# Patient Record
Sex: FEMALE | Race: WHITE
[De-identification: names, ages, dates, MRNs, and addresses within clinical notes are randomized per-mention and may not be internally consistent; named-entity substitution may affect disease eponyms.]

---
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
## 2022-02-23 ENCOUNTER — APPOINTMENT (OUTPATIENT)
Dept: SURGERY | Facility: CLINIC | Age: 22
End: 2022-02-23
Payer: MEDICAID

## 2022-02-23 VITALS
TEMPERATURE: 97.9 F | SYSTOLIC BLOOD PRESSURE: 119 MMHG | DIASTOLIC BLOOD PRESSURE: 60 MMHG | HEART RATE: 73 BPM | RESPIRATION RATE: 16 BRPM | OXYGEN SATURATION: 99 % | WEIGHT: 135 LBS | BODY MASS INDEX: 23.05 KG/M2 | HEIGHT: 64 IN

## 2022-02-23 DIAGNOSIS — S39.013A STRAIN OF MUSCLE, FASCIA AND TENDON OF PELVIS, INITIAL ENCOUNTER: ICD-10-CM

## 2022-02-23 PROCEDURE — 99202 OFFICE O/P NEW SF 15 MIN: CPT

## 2022-02-23 NOTE — ASSESSMENT
[FreeTextEntry1] : 22 yo female with right groin strain. discussed imaging in 2-3 months if symptoms don’t improve. For now we discussed exercise modification and groin rehab exercises.

## 2022-02-23 NOTE — HISTORY OF PRESENT ILLNESS
[de-identified] : 22 yo healthy female presents for evaluation of right groin discomfort x 1 month. She states the discomfort in not constant and related to certain movements. She workout out daily doing squats and deadlifts. She denies any lumps. She was seen by her PMD who ordered a sonogram to rule out ovarian cyst, and then referred her here to rule out a hernia.

## 2022-02-23 NOTE — PHYSICAL EXAM
[Respiratory Effort] : normal respiratory effort [Alert] : alert [Oriented to Person] : oriented to person [Oriented to Place] : oriented to place [Oriented to Time] : oriented to time [Calm] : calm [JVD] : no jugular venous distention  [Abdominal Masses] : No abdominal masses [Abdomen Tenderness] : ~T ~M No abdominal tenderness [de-identified] : BETSY ROE NAD [de-identified] : EOMI [de-identified] : soft NT, ND no evidence of groin hernias, + tenderness over groin musculature.

## 2024-12-29 ENCOUNTER — NON-APPOINTMENT (OUTPATIENT)
Age: 24
End: 2024-12-29

## 2025-08-25 ENCOUNTER — NON-APPOINTMENT (OUTPATIENT)
Age: 25
End: 2025-08-25